# Patient Record
Sex: MALE | Race: WHITE | NOT HISPANIC OR LATINO | Employment: OTHER | URBAN - METROPOLITAN AREA
[De-identification: names, ages, dates, MRNs, and addresses within clinical notes are randomized per-mention and may not be internally consistent; named-entity substitution may affect disease eponyms.]

---

## 2019-11-25 ENCOUNTER — OFFICE VISIT (OUTPATIENT)
Dept: FAMILY MEDICINE | Facility: CLINIC | Age: 69
End: 2019-11-25
Payer: MEDICARE

## 2019-11-25 VITALS
OXYGEN SATURATION: 99 % | SYSTOLIC BLOOD PRESSURE: 126 MMHG | DIASTOLIC BLOOD PRESSURE: 82 MMHG | HEIGHT: 68 IN | RESPIRATION RATE: 18 BRPM | BODY MASS INDEX: 32.51 KG/M2 | WEIGHT: 214.5 LBS | TEMPERATURE: 98 F | HEART RATE: 66 BPM

## 2019-11-25 DIAGNOSIS — Z01.818 PREOP EXAM FOR INTERNAL MEDICINE: ICD-10-CM

## 2019-11-25 DIAGNOSIS — M17.12 PRIMARY OSTEOARTHRITIS OF LEFT KNEE: Primary | ICD-10-CM

## 2019-11-25 PROCEDURE — 99999 PR PBB SHADOW E&M-NEW PATIENT-LVL III: CPT | Mod: PBBFAC,,, | Performed by: INTERNAL MEDICINE

## 2019-11-25 PROCEDURE — 1159F MED LIST DOCD IN RCRD: CPT | Mod: ,,, | Performed by: INTERNAL MEDICINE

## 2019-11-25 PROCEDURE — 1126F PR PAIN SEVERITY QUANTIFIED, NO PAIN PRESENT: ICD-10-PCS | Mod: ,,, | Performed by: INTERNAL MEDICINE

## 2019-11-25 PROCEDURE — 99213 PR OFFICE/OUTPT VISIT, EST, LEVL III, 20-29 MIN: ICD-10-PCS | Mod: S$PBB,,, | Performed by: INTERNAL MEDICINE

## 2019-11-25 PROCEDURE — 99203 OFFICE O/P NEW LOW 30 MIN: CPT | Mod: PBBFAC,PN | Performed by: INTERNAL MEDICINE

## 2019-11-25 PROCEDURE — 99999 PR PBB SHADOW E&M-NEW PATIENT-LVL III: ICD-10-PCS | Mod: PBBFAC,,, | Performed by: INTERNAL MEDICINE

## 2019-11-25 PROCEDURE — 1159F PR MEDICATION LIST DOCUMENTED IN MEDICAL RECORD: ICD-10-PCS | Mod: ,,, | Performed by: INTERNAL MEDICINE

## 2019-11-25 PROCEDURE — 99213 OFFICE O/P EST LOW 20 MIN: CPT | Mod: S$PBB,,, | Performed by: INTERNAL MEDICINE

## 2019-11-25 PROCEDURE — 1126F AMNT PAIN NOTED NONE PRSNT: CPT | Mod: ,,, | Performed by: INTERNAL MEDICINE

## 2019-11-25 RX ORDER — HYDROCHLOROTHIAZIDE 12.5 MG/1
12.5 TABLET ORAL NIGHTLY
COMMUNITY

## 2019-11-25 RX ORDER — TAMSULOSIN HYDROCHLORIDE 0.4 MG/1
CAPSULE ORAL NIGHTLY
COMMUNITY
Start: 2019-09-09

## 2019-11-25 RX ORDER — SILDENAFIL 25 MG/1
25 TABLET, FILM COATED ORAL DAILY PRN
COMMUNITY

## 2019-11-25 RX ORDER — ASPIRIN 81 MG/1
81 TABLET ORAL
COMMUNITY

## 2019-11-25 RX ORDER — IRBESARTAN 300 MG/1
300 TABLET ORAL
COMMUNITY
End: 2022-01-07

## 2019-11-25 NOTE — LETTER
[facility] NETWORK      Michel Hood MD              [facility]  [address] [place] [address], [address]  [facility] [address]  Dept: [phone]  Dept Fax: [phone]                                                                                                     Re: Name: David Lin          : 1950        David Lin has been examined by me on 2019, and appears to be physically well for       ___ General Anesthesia       ___ Sports Participation     ___ School /      ___ Camp        Sincerely,      Michel Hood MD

## 2019-11-25 NOTE — PROGRESS NOTES
[facility] Note    Chief Complaint      Chief Complaint   Patient presents with    Pre-op Exam       History of Present Illness      David Lin is a 69 y.o. male who presents today for   Chief Complaint   Patient presents with    Pre-op Exam   .  Patient comes to appointment today for preop assessment for left tkr . He has good functional status . Denies chest pain , an go up flight of steps with no chest pain or guzman . Had full labs , cxr and ekg done at [facility] all reviewed today and look good .     HPI    No problem-specific Assessment & Plan notes found for this encounter.       Problem List Items Addressed This Visit        Orthopedic    Primary osteoarthritis of left knee - Primary    Overview     For tkr will defer to surgery for post op management             Other    Preop exam for internal medicine    Overview     Pt is cleared for procedure with low risk cardiac complications                 Past Medical History:  History reviewed. No pertinent past medical history.    Past Surgical History:  History reviewed. No pertinent surgical history.    Family History:  family history includes Cancer in his mother.     Social History:  Social History     Socioeconomic History    Marital status:      Spouse name: Not on file    Number of children: Not on file    Years of education: Not on file    Highest education level: Not on file   Occupational History    Not on file   Social Needs    Financial resource strain: Not hard at all    Food insecurity:     Worry: Never true     Inability: Never true    Transportation needs:     Medical: No     Non-medical: No   Tobacco Use    Smoking status: Never Smoker    Smokeless tobacco: Never Used   Substance and Sexual Activity    Alcohol use: Yes     Frequency: 2-4 times a month     Drinks per session: 5 or 6    Drug use: Never    Sexual activity: Yes     Partners: Female   Lifestyle    Physical activity:     Days per week: 2 days     " Minutes per session: 20 min    Stress: Not at all   Relationships    Social connections:     Talks on phone: More than three times a week     Gets together: Once a week     Attends Shinto service: More than 4 times per year     Active member of club or organization: No     Attends meetings of clubs or organizations: Never     Relationship status:    Other Topics Concern    Not on file   Social History Narrative    Not on file       Review of Systems:   Review of Systems   Constitutional: Negative for fever and weight loss.   HENT: Negative for congestion, hearing loss and sore throat.    Eyes: Negative for blurred vision.   Respiratory: Negative for cough and shortness of breath.    Cardiovascular: Negative for chest pain, palpitations, claudication and leg swelling.   Gastrointestinal: Negative for abdominal pain, constipation, diarrhea and heartburn.   Genitourinary: Negative for dysuria.   Musculoskeletal: Positive for joint pain. Negative for back pain and myalgias.   Skin: Negative for rash.   Neurological: Negative for focal weakness and headaches.   Psychiatric/Behavioral: Negative for depression and suicidal ideas. The patient is not nervous/anxious.         Medications:  Outpatient Encounter Medications as of 11/25/2019   Medication Sig Dispense Refill    aspirin (ECOTRIN) 81 MG EC tablet Take 81 mg by mouth.      hydroCHLOROthiazide (HYDRODIURIL) 12.5 MG Tab Take 12.5 mg by mouth.      irbesartan (AVAPRO) 300 MG tablet Take 300 mg by mouth.      sildenafil (VIAGRA) 25 MG tablet Take 25 mg by mouth daily as needed for Erectile Dysfunction.      tamsulosin (FLOMAX) 0.4 mg Cap        No facility-administered encounter medications on file as of 11/25/2019.        Allergies:  Review of patient's allergies indicates:   Allergen Reactions    Succinylcholine chloride Other (See Comments)     STATED "MY MOTHER WAS ALLERGIC TO IT, I WAS TOLD TO NEVER TAKE IT"           Physical Exam      Vitals: " "   11/25/19 1311   BP: 126/82   Pulse: 66   Resp: 18   Temp: 98 °F (36.7 °C)        Vital Signs  Temp: 98 °F (36.7 °C)  Temp src: Oral  Pulse: 66  Resp: 18  SpO2: 99 %  BP: 126/82  BP Location: Right arm  Patient Position: Sitting  Pain Score: 0-No pain  Height and Weight  Height: 5' 8" (172.7 cm)  Weight: 97.3 kg (214 lb 8.1 oz)  BSA (Calculated - sq m): 2.16 sq meters  BMI (Calculated): 32.6  Weight in (lb) to have BMI = 25: 164.1]     Body mass index is 32.62 kg/m².    Physical Exam   Constitutional: He is oriented to person, place, and time. He appears well-developed and well-nourished.   HENT:   Head: Normocephalic.   Eyes: Pupils are equal, round, and reactive to light.   Neck: Normal range of motion. No thyromegaly present.   Cardiovascular: Normal rate and regular rhythm. Exam reveals no gallop and no friction rub.   No murmur heard.  Pulmonary/Chest: Effort normal and breath sounds normal.   Abdominal: Soft. Bowel sounds are normal.   Musculoskeletal: Normal range of motion. He exhibits no edema.   Neurological: He is alert and oriented to person, place, and time. No sensory deficit.   Skin: Skin is warm and dry.   Psychiatric: He has a normal mood and affect. His behavior is normal.        Laboratory:  CBC:  No results for input(s): WBC, RBC, HGB, HCT, PLT, MCV, MCH, MCHC in the last 2160 hours.  CMP:  No results for input(s): GLU, CALCIUM, ALBUMIN, PROT, NA, K, CO2, CL, BUN, ALKPHOS, ALT, AST, BILITOT in the last 2160 hours.    Invalid input(s): CREATININ  URINALYSIS:  No results for input(s): COLORU, CLARITYU, SPECGRAV, PHUR, PROTEINUA, GLUCOSEU, BILIRUBINCON, BLOODU, WBCU, RBCU, BACTERIA, MUCUS, NITRITE, LEUKOCYTESUR, UROBILINOGEN, HYALINECASTS in the last 2160 hours.   LIPIDS:  No results for input(s): TSH, HDL, CHOL, TRIG, LDLCALC, CHOLHDL, NONHDLCHOL, TOTALCHOLEST in the last 2160 hours.  TSH:  No results for input(s): TSH in the last 2160 hours.  A1C:  No results for input(s): HGBA1C in the last " 2160 hours.    Radiology:        Assessment:     David Lin is a 69 y.o.male with:    Primary osteoarthritis of left knee    Preop exam for internal medicine                Plan:     Problem List Items Addressed This Visit        Orthopedic    Primary osteoarthritis of left knee - Primary    Overview     For tkr will defer to surgery for post op management             Other    Preop exam for internal medicine    Overview     Pt is cleared for procedure with low risk cardiac complications                As above, continue current medications and maintain follow up with specialists.  Return to clinic in 6  months.    Frederick W Dantagnan [facility]

## 2020-05-08 DIAGNOSIS — Z12.11 COLON CANCER SCREENING: ICD-10-CM

## 2021-06-24 ENCOUNTER — HOSPITAL ENCOUNTER (EMERGENCY)
Facility: HOSPITAL | Age: 71
Discharge: HOME | End: 2021-06-25
Attending: FAMILY MEDICINE
Payer: MEDICARE

## 2021-06-24 VITALS
OXYGEN SATURATION: 98 % | SYSTOLIC BLOOD PRESSURE: 126 MMHG | WEIGHT: 200 LBS | BODY MASS INDEX: 29.62 KG/M2 | DIASTOLIC BLOOD PRESSURE: 69 MMHG | TEMPERATURE: 98 F | HEIGHT: 69 IN | RESPIRATION RATE: 16 BRPM | HEART RATE: 70 BPM

## 2021-06-24 DIAGNOSIS — S01.81XA FOREHEAD LACERATION, INITIAL ENCOUNTER: Primary | ICD-10-CM

## 2021-06-24 PROCEDURE — 12011 RPR F/E/E/N/L/M 2.5 CM/<: CPT

## 2021-06-24 PROCEDURE — 99284 EMERGENCY DEPT VISIT MOD MDM: CPT | Mod: 25

## 2021-06-24 PROCEDURE — 70450 CT HEAD/BRAIN W/O DYE: CPT | Mod: 26,,, | Performed by: RADIOLOGY

## 2021-06-24 PROCEDURE — 70450 CT HEAD/BRAIN W/O DYE: CPT | Mod: TC

## 2021-06-24 PROCEDURE — 70450 CT HEAD WITHOUT CONTRAST: ICD-10-PCS | Mod: 26,,, | Performed by: RADIOLOGY

## 2021-06-24 RX ORDER — LIDOCAINE HYDROCHLORIDE AND EPINEPHRINE 10; 10 MG/ML; UG/ML
10 INJECTION, SOLUTION INFILTRATION; PERINEURAL ONCE
Status: COMPLETED | OUTPATIENT
Start: 2021-06-25 | End: 2021-06-25

## 2021-06-25 PROCEDURE — 25000003 PHARM REV CODE 250: Performed by: FAMILY MEDICINE

## 2021-06-25 PROCEDURE — 12011 RPR F/E/E/N/L/M 2.5 CM/<: CPT

## 2021-06-25 RX ADMIN — LIDOCAINE HYDROCHLORIDE AND EPINEPHRINE 10 ML: 10; 10 INJECTION, SOLUTION INFILTRATION; PERINEURAL at 12:06

## 2021-08-25 DIAGNOSIS — Z12.11 COLON CANCER SCREENING: ICD-10-CM

## 2022-01-07 RX ORDER — IRBESARTAN 300 MG/1
TABLET ORAL
Qty: 90 TABLET | Refills: 3 | Status: SHIPPED | OUTPATIENT
Start: 2022-01-07 | End: 2022-08-10

## 2022-05-31 ENCOUNTER — HOSPITAL ENCOUNTER (OUTPATIENT)
Dept: RADIOLOGY | Facility: HOSPITAL | Age: 72
Discharge: HOME | End: 2022-05-31
Attending: SPECIALIST
Payer: MEDICARE

## 2022-05-31 DIAGNOSIS — R79.89 ABNORMAL LIVER FUNCTION TEST: ICD-10-CM

## 2022-05-31 PROCEDURE — 76700 US ABDOMEN COMPLETE: ICD-10-PCS | Mod: 26,,, | Performed by: RADIOLOGY

## 2022-05-31 PROCEDURE — 76700 US EXAM ABDOM COMPLETE: CPT | Mod: TC

## 2022-05-31 PROCEDURE — 76700 US EXAM ABDOM COMPLETE: CPT | Mod: 26,,, | Performed by: RADIOLOGY

## 2022-06-30 ENCOUNTER — PATIENT MESSAGE (OUTPATIENT)
Dept: INTERNAL MEDICINE | Facility: CLINIC | Age: 72
End: 2022-06-30
Payer: MEDICARE

## 2022-06-30 ENCOUNTER — TELEPHONE (OUTPATIENT)
Dept: INTERNAL MEDICINE | Facility: CLINIC | Age: 72
End: 2022-06-30
Payer: MEDICARE

## 2022-07-05 ENCOUNTER — OFFICE VISIT (OUTPATIENT)
Dept: SURGERY | Facility: CLINIC | Age: 72
End: 2022-07-05
Payer: MEDICARE

## 2022-07-05 DIAGNOSIS — K43.2 INCISIONAL HERNIA, WITHOUT OBSTRUCTION OR GANGRENE: Primary | ICD-10-CM

## 2022-07-05 DIAGNOSIS — K43.2 INCISIONAL HERNIA: ICD-10-CM

## 2022-07-05 PROCEDURE — 99441 PR PHYSICIAN TELEPHONE EVALUATION 5-10 MIN: ICD-10-PCS | Mod: 95,,, | Performed by: SURGERY

## 2022-07-05 PROCEDURE — 99441 PR PHYSICIAN TELEPHONE EVALUATION 5-10 MIN: CPT | Mod: 95,,, | Performed by: SURGERY

## 2022-07-05 RX ORDER — CEFAZOLIN SODIUM/D5W 2 G/100 ML
2 PLASTIC BAG, INJECTION (ML) INTRAVENOUS
Status: CANCELLED | OUTPATIENT
Start: 2022-07-05

## 2022-07-05 RX ORDER — ENOXAPARIN SODIUM 100 MG/ML
40 INJECTION SUBCUTANEOUS
Status: CANCELLED | OUTPATIENT
Start: 2022-07-05

## 2022-07-05 RX ORDER — SODIUM CHLORIDE 9 MG/ML
INJECTION, SOLUTION INTRAVENOUS CONTINUOUS
Status: CANCELLED | OUTPATIENT
Start: 2022-07-05

## 2022-07-05 NOTE — H&P (VIEW-ONLY)
Established Patient - Audio Only Telehealth Visit     The patient location is:  Home  The chief complaint leading to consultation is:  Incisional hernia  Visit type: Virtual visit with audio only (telephone)  Total time spent with patient:  10 minutes     The reason for the audio only service rather than synchronous audio and video virtual visit was related to technical difficulties or patient preference/necessity.     Each patient to whom I provide medical services by telemedicine is:  (1) informed of the relationship between the physician and patient and the respective role of any other health care provider with respect to management of the patient; and (2) notified that they may decline to receive medical services by telemedicine and may withdraw from such care at any time. Patient verbally consented to receive this service via voice-only telephone call.       HPI:  71-year-old male with incisional hernia from a laparoscopic cholecystectomy site near the umbilicus.  Patient states this has progressively gotten worse.  Has an obvious bulge and defect at the incision site.  Has more pain and tenderness now.  States it occasionally gets stuck where he has to push it in now.  Would like surgical intervention soon as symptoms are progressively getting much worse.    Assessment and plan:  71-year-old male with a incisional hernia from a laparoscopic cholecystectomy extraction site.  Recommend robotic incisional hernia repair.  Patient agrees to this plan.  Will be done urgently due to patient's worsening pain episodes

## 2022-07-06 ENCOUNTER — ANESTHESIA EVENT (OUTPATIENT)
Dept: SURGERY | Facility: OTHER | Age: 72
End: 2022-07-06
Payer: MEDICARE

## 2022-07-06 ENCOUNTER — TELEPHONE (OUTPATIENT)
Dept: SURGERY | Facility: CLINIC | Age: 72
End: 2022-07-06
Payer: MEDICARE

## 2022-07-06 RX ORDER — TADALAFIL 5 MG/1
TABLET ORAL
COMMUNITY
Start: 2022-03-15

## 2022-07-06 NOTE — PRE-PROCEDURE INSTRUCTIONS
Pre admit phone call completed.    Instructions given to patient about NPO status as follows:     The evening before surgery do not eat anything after 9 p.m. ( this includes hard candy, chewing gum and mints).  You may only have GATORADE, POWERADE AND WATER from 9 p.m. until you leave your home. DO NOT  DRINK ANY LIQUIDS ON THE WAY TO THE HOSPITAL.      Patient was also instructed on the below information:    Park in the Parking lot behind the hospital or in the [employer] [facility] across the street from the parking lot.  Parking is complimentary.  If you will be discharged the same day as your procedure, please arrange for a responsible adult to drive you home or  to accompany you if traveling by taxi.  YOU WILL NOT BE PERMITTED TO DRIVE OR TO LEAVE THE HOSPITAL ALONE AFTER SURGERY.  It is strongly recommended that you arrange for someone to remain with you for the first 24 hrs following your surgery.    Patient verbalized understanding of above instructions.

## 2022-07-06 NOTE — TELEPHONE ENCOUNTER
----- Message from Tamela Hoskins sent at 7/6/2022  4:19 PM CDT -----  Contact: [phone]  Pt is calling for surgery time    [phone]

## 2022-07-06 NOTE — TELEPHONE ENCOUNTER
Spoke with patient. Patient was instructed to be at the hospital for 11am. Verbalized understanding. No further issues discussed.

## 2022-07-07 ENCOUNTER — HOSPITAL ENCOUNTER (OUTPATIENT)
Facility: OTHER | Age: 72
Discharge: HOME | End: 2022-07-07
Attending: SURGERY | Admitting: SURGERY
Payer: MEDICARE

## 2022-07-07 ENCOUNTER — ANESTHESIA (OUTPATIENT)
Dept: SURGERY | Facility: OTHER | Age: 72
End: 2022-07-07
Payer: MEDICARE

## 2022-07-07 DIAGNOSIS — K43.2 INCISIONAL HERNIA: ICD-10-CM

## 2022-07-07 DIAGNOSIS — K43.2 INCISIONAL HERNIA, WITHOUT OBSTRUCTION OR GANGRENE: Primary | ICD-10-CM

## 2022-07-07 DIAGNOSIS — Z01.818 PRE-OP TESTING: ICD-10-CM

## 2022-07-07 LAB
ANION GAP SERPL CALC-SCNC: 10 MMOL/L (ref 8–16)
BASOPHILS # BLD AUTO: 0.01 K/UL (ref 0–0.2)
BASOPHILS NFR BLD: 0.2 % (ref 0–1.9)
BUN SERPL-MCNC: 18 MG/DL (ref 8–23)
CALCIUM SERPL-MCNC: 9.7 MG/DL (ref 8.7–10.5)
CHLORIDE SERPL-SCNC: 105 MMOL/L (ref 95–110)
CO2 SERPL-SCNC: 29 MMOL/L (ref 23–29)
CREAT SERPL-MCNC: 1 MG/DL (ref 0.5–1.4)
DIFFERENTIAL METHOD: ABNORMAL
EOSINOPHIL # BLD AUTO: 0.1 K/UL (ref 0–0.5)
EOSINOPHIL NFR BLD: 1.2 % (ref 0–8)
ERYTHROCYTE [DISTWIDTH] IN BLOOD BY AUTOMATED COUNT: 12.1 % (ref 11.5–14.5)
EST. GFR  (AFRICAN AMERICAN): >60 ML/MIN/1.73 M^2
EST. GFR  (NON AFRICAN AMERICAN): >60 ML/MIN/1.73 M^2
GLUCOSE SERPL-MCNC: 111 MG/DL (ref 70–110)
HCT VFR BLD AUTO: 40.1 % (ref 40–54)
HGB BLD-MCNC: 13.4 G/DL (ref 14–18)
IMM GRANULOCYTES # BLD AUTO: 0.01 K/UL (ref 0–0.04)
IMM GRANULOCYTES NFR BLD AUTO: 0.2 % (ref 0–0.5)
LYMPHOCYTES # BLD AUTO: 1 K/UL (ref 1–4.8)
LYMPHOCYTES NFR BLD: 20.2 % (ref 18–48)
MCH RBC QN AUTO: 32.3 PG (ref 27–31)
MCHC RBC AUTO-ENTMCNC: 33.4 G/DL (ref 32–36)
MCV RBC AUTO: 97 FL (ref 82–98)
MONOCYTES # BLD AUTO: 0.5 K/UL (ref 0.3–1)
MONOCYTES NFR BLD: 9.9 % (ref 4–15)
NEUTROPHILS # BLD AUTO: 3.5 K/UL (ref 1.8–7.7)
NEUTROPHILS NFR BLD: 68.3 % (ref 38–73)
NRBC BLD-RTO: 0 /100 WBC
PLATELET # BLD AUTO: 178 K/UL (ref 150–450)
PMV BLD AUTO: 9.7 FL (ref 9.2–12.9)
POTASSIUM SERPL-SCNC: 4.2 MMOL/L (ref 3.5–5.1)
RBC # BLD AUTO: 4.15 M/UL (ref 4.6–6.2)
SODIUM SERPL-SCNC: 144 MMOL/L (ref 136–145)
WBC # BLD AUTO: 5.16 K/UL (ref 3.9–12.7)

## 2022-07-07 PROCEDURE — 85025 COMPLETE CBC W/AUTO DIFF WBC: CPT | Performed by: SURGERY

## 2022-07-07 PROCEDURE — 71000016 HC POSTOP RECOV ADDL HR: Performed by: SURGERY

## 2022-07-07 PROCEDURE — 49655 PR LAP, INCISIONAL HERNIA REPAIR,INCARCERATED: ICD-10-PCS | Mod: ,,, | Performed by: SURGERY

## 2022-07-07 PROCEDURE — 63600175 PHARM REV CODE 636 W HCPCS: Performed by: ANESTHESIOLOGY

## 2022-07-07 PROCEDURE — 36000710: Performed by: SURGERY

## 2022-07-07 PROCEDURE — 63600175 PHARM REV CODE 636 W HCPCS: Performed by: STUDENT IN AN ORGANIZED HEALTH CARE EDUCATION/TRAINING PROGRAM

## 2022-07-07 PROCEDURE — C1781 MESH (IMPLANTABLE): HCPCS | Performed by: SURGERY

## 2022-07-07 PROCEDURE — 71000033 HC RECOVERY, INTIAL HOUR: Performed by: SURGERY

## 2022-07-07 PROCEDURE — 37000009 HC ANESTHESIA EA ADD 15 MINS: Performed by: SURGERY

## 2022-07-07 PROCEDURE — 63600175 PHARM REV CODE 636 W HCPCS: Performed by: SURGERY

## 2022-07-07 PROCEDURE — 71000039 HC RECOVERY, EACH ADD'L HOUR: Performed by: SURGERY

## 2022-07-07 PROCEDURE — 36415 COLL VENOUS BLD VENIPUNCTURE: CPT | Performed by: SURGERY

## 2022-07-07 PROCEDURE — P9045 ALBUMIN (HUMAN), 5%, 250 ML: HCPCS | Mod: JG | Performed by: STUDENT IN AN ORGANIZED HEALTH CARE EDUCATION/TRAINING PROGRAM

## 2022-07-07 PROCEDURE — 80048 BASIC METABOLIC PNL TOTAL CA: CPT | Performed by: SURGERY

## 2022-07-07 PROCEDURE — 93005 ELECTROCARDIOGRAM TRACING: CPT | Mod: 59

## 2022-07-07 PROCEDURE — 93010 EKG 12-LEAD: ICD-10-PCS | Mod: ,,, | Performed by: INTERNAL MEDICINE

## 2022-07-07 PROCEDURE — 36000711: Performed by: SURGERY

## 2022-07-07 PROCEDURE — 37000008 HC ANESTHESIA 1ST 15 MINUTES: Performed by: SURGERY

## 2022-07-07 PROCEDURE — 93010 ELECTROCARDIOGRAM REPORT: CPT | Mod: ,,, | Performed by: INTERNAL MEDICINE

## 2022-07-07 PROCEDURE — 49655 PR LAP, INCISIONAL HERNIA REPAIR,INCARCERATED: CPT | Mod: ,,, | Performed by: SURGERY

## 2022-07-07 PROCEDURE — 27201423 OPTIME MED/SURG SUP & DEVICES STERILE SUPPLY: Performed by: SURGERY

## 2022-07-07 PROCEDURE — 25000003 PHARM REV CODE 250: Performed by: STUDENT IN AN ORGANIZED HEALTH CARE EDUCATION/TRAINING PROGRAM

## 2022-07-07 PROCEDURE — 71000015 HC POSTOP RECOV 1ST HR: Performed by: SURGERY

## 2022-07-07 PROCEDURE — 25000003 PHARM REV CODE 250: Performed by: SURGERY

## 2022-07-07 PROCEDURE — 25000003 PHARM REV CODE 250: Performed by: ANESTHESIOLOGY

## 2022-07-07 DEVICE — MESH PROGRIP LAP 12X16CM LEFT: Type: IMPLANTABLE DEVICE | Site: ABDOMEN | Status: FUNCTIONAL

## 2022-07-07 RX ORDER — DEXMEDETOMIDINE HYDROCHLORIDE 100 UG/ML
INJECTION, SOLUTION INTRAVENOUS
Status: DISCONTINUED | OUTPATIENT
Start: 2022-07-07 | End: 2022-07-07

## 2022-07-07 RX ORDER — HYDROMORPHONE HYDROCHLORIDE 2 MG/ML
INJECTION, SOLUTION INTRAMUSCULAR; INTRAVENOUS; SUBCUTANEOUS
Status: DISCONTINUED | OUTPATIENT
Start: 2022-07-07 | End: 2022-07-07

## 2022-07-07 RX ORDER — MIDAZOLAM HYDROCHLORIDE 1 MG/ML
INJECTION INTRAMUSCULAR; INTRAVENOUS
Status: DISCONTINUED | OUTPATIENT
Start: 2022-07-07 | End: 2022-07-07

## 2022-07-07 RX ORDER — PROPOFOL 10 MG/ML
VIAL (ML) INTRAVENOUS CONTINUOUS PRN
Status: DISCONTINUED | OUTPATIENT
Start: 2022-07-07 | End: 2022-07-07

## 2022-07-07 RX ORDER — SODIUM CHLORIDE 0.9 % (FLUSH) 0.9 %
3 SYRINGE (ML) INJECTION
Status: DISCONTINUED | OUTPATIENT
Start: 2022-07-07 | End: 2022-07-07 | Stop reason: HOSPADM

## 2022-07-07 RX ORDER — OXYCODONE AND ACETAMINOPHEN 7.5; 325 MG/1; MG/1
1 TABLET ORAL EVERY 6 HOURS PRN
Qty: 20 TABLET | Refills: 0 | Status: SHIPPED | OUTPATIENT
Start: 2022-07-07 | End: 2022-08-10

## 2022-07-07 RX ORDER — BUPIVACAINE HYDROCHLORIDE 2.5 MG/ML
INJECTION, SOLUTION EPIDURAL; INFILTRATION; INTRACAUDAL
Status: DISCONTINUED | OUTPATIENT
Start: 2022-07-07 | End: 2022-07-07 | Stop reason: HOSPADM

## 2022-07-07 RX ORDER — MEPERIDINE HYDROCHLORIDE 25 MG/ML
12.5 INJECTION INTRAMUSCULAR; INTRAVENOUS; SUBCUTANEOUS ONCE AS NEEDED
Status: DISCONTINUED | OUTPATIENT
Start: 2022-07-07 | End: 2022-07-07 | Stop reason: HOSPADM

## 2022-07-07 RX ORDER — KETAMINE HCL IN 0.9 % NACL 50 MG/5 ML
SYRINGE (ML) INTRAVENOUS
Status: DISCONTINUED | OUTPATIENT
Start: 2022-07-07 | End: 2022-07-07

## 2022-07-07 RX ORDER — DEXAMETHASONE SODIUM PHOSPHATE 4 MG/ML
INJECTION, SOLUTION INTRA-ARTICULAR; INTRALESIONAL; INTRAMUSCULAR; INTRAVENOUS; SOFT TISSUE
Status: DISCONTINUED | OUTPATIENT
Start: 2022-07-07 | End: 2022-07-07

## 2022-07-07 RX ORDER — FENTANYL CITRATE 50 UG/ML
INJECTION, SOLUTION INTRAMUSCULAR; INTRAVENOUS
Status: DISCONTINUED | OUTPATIENT
Start: 2022-07-07 | End: 2022-07-07

## 2022-07-07 RX ORDER — ALBUMIN HUMAN 50 G/1000ML
SOLUTION INTRAVENOUS CONTINUOUS PRN
Status: DISCONTINUED | OUTPATIENT
Start: 2022-07-07 | End: 2022-07-07

## 2022-07-07 RX ORDER — OXYCODONE HYDROCHLORIDE 5 MG/1
5 TABLET ORAL
Status: DISCONTINUED | OUTPATIENT
Start: 2022-07-07 | End: 2022-07-07 | Stop reason: HOSPADM

## 2022-07-07 RX ORDER — ONDANSETRON 8 MG/1
8 TABLET, ORALLY DISINTEGRATING ORAL ONCE
Qty: 15 TABLET | Refills: 0 | Status: SHIPPED | OUTPATIENT
Start: 2022-07-07 | End: 2022-07-22

## 2022-07-07 RX ORDER — PROCHLORPERAZINE EDISYLATE 5 MG/ML
5 INJECTION INTRAMUSCULAR; INTRAVENOUS EVERY 30 MIN PRN
Status: DISCONTINUED | OUTPATIENT
Start: 2022-07-07 | End: 2022-07-07 | Stop reason: HOSPADM

## 2022-07-07 RX ORDER — CEFAZOLIN SODIUM 2 G/50ML
2 SOLUTION INTRAVENOUS
Status: COMPLETED | OUTPATIENT
Start: 2022-07-07 | End: 2022-07-07

## 2022-07-07 RX ORDER — ONDANSETRON 2 MG/ML
INJECTION INTRAMUSCULAR; INTRAVENOUS
Status: DISCONTINUED | OUTPATIENT
Start: 2022-07-07 | End: 2022-07-07

## 2022-07-07 RX ORDER — SODIUM CHLORIDE 9 MG/ML
INJECTION, SOLUTION INTRAVENOUS CONTINUOUS
Status: DISCONTINUED | OUTPATIENT
Start: 2022-07-07 | End: 2022-07-07 | Stop reason: HOSPADM

## 2022-07-07 RX ORDER — ROCURONIUM BROMIDE 10 MG/ML
INJECTION, SOLUTION INTRAVENOUS
Status: DISCONTINUED | OUTPATIENT
Start: 2022-07-07 | End: 2022-07-07

## 2022-07-07 RX ORDER — PROPOFOL 10 MG/ML
VIAL (ML) INTRAVENOUS
Status: DISCONTINUED | OUTPATIENT
Start: 2022-07-07 | End: 2022-07-07

## 2022-07-07 RX ORDER — LIDOCAINE HYDROCHLORIDE 10 MG/ML
0.5 INJECTION, SOLUTION EPIDURAL; INFILTRATION; INTRACAUDAL; PERINEURAL ONCE
Status: ACTIVE | OUTPATIENT
Start: 2022-07-07

## 2022-07-07 RX ORDER — ENOXAPARIN SODIUM 100 MG/ML
40 INJECTION SUBCUTANEOUS
Status: DISCONTINUED | OUTPATIENT
Start: 2022-07-07 | End: 2022-07-07 | Stop reason: HOSPADM

## 2022-07-07 RX ORDER — HYDROMORPHONE HYDROCHLORIDE 2 MG/ML
0.4 INJECTION, SOLUTION INTRAMUSCULAR; INTRAVENOUS; SUBCUTANEOUS EVERY 5 MIN PRN
Status: DISCONTINUED | OUTPATIENT
Start: 2022-07-07 | End: 2022-07-07 | Stop reason: HOSPADM

## 2022-07-07 RX ORDER — LIDOCAINE HYDROCHLORIDE 20 MG/ML
INJECTION INTRAVENOUS
Status: DISCONTINUED | OUTPATIENT
Start: 2022-07-07 | End: 2022-07-07

## 2022-07-07 RX ORDER — SODIUM CHLORIDE, SODIUM LACTATE, POTASSIUM CHLORIDE, CALCIUM CHLORIDE 600; 310; 30; 20 MG/100ML; MG/100ML; MG/100ML; MG/100ML
INJECTION, SOLUTION INTRAVENOUS CONTINUOUS
Status: ACTIVE | OUTPATIENT
Start: 2022-07-07

## 2022-07-07 RX ADMIN — ROCURONIUM BROMIDE 10 MG: 10 SOLUTION INTRAVENOUS at 02:07

## 2022-07-07 RX ADMIN — ROCURONIUM BROMIDE 20 MG: 10 SOLUTION INTRAVENOUS at 02:07

## 2022-07-07 RX ADMIN — PROPOFOL 150 MG: 10 INJECTION, EMULSION INTRAVENOUS at 01:07

## 2022-07-07 RX ADMIN — SODIUM CHLORIDE, SODIUM LACTATE, POTASSIUM CHLORIDE, AND CALCIUM CHLORIDE: 600; 310; 30; 20 INJECTION, SOLUTION INTRAVENOUS at 01:07

## 2022-07-07 RX ADMIN — ROCURONIUM BROMIDE 40 MG: 10 SOLUTION INTRAVENOUS at 01:07

## 2022-07-07 RX ADMIN — MIDAZOLAM HYDROCHLORIDE 2 MG: 1 INJECTION, SOLUTION INTRAMUSCULAR; INTRAVENOUS at 01:07

## 2022-07-07 RX ADMIN — DEXMEDETOMIDINE HYDROCHLORIDE 12 MCG: 100 INJECTION, SOLUTION, CONCENTRATE INTRAVENOUS at 02:07

## 2022-07-07 RX ADMIN — HYDROMORPHONE HYDROCHLORIDE 0.2 MG: 2 INJECTION INTRAMUSCULAR; INTRAVENOUS; SUBCUTANEOUS at 04:07

## 2022-07-07 RX ADMIN — Medication 40 MG: at 02:07

## 2022-07-07 RX ADMIN — HYDROMORPHONE HYDROCHLORIDE 0.5 MG: 2 INJECTION INTRAMUSCULAR; INTRAVENOUS; SUBCUTANEOUS at 03:07

## 2022-07-07 RX ADMIN — GLYCOPYRROLATE 0.2 MG: 0.2 INJECTION, SOLUTION INTRAMUSCULAR; INTRAVITREAL at 02:07

## 2022-07-07 RX ADMIN — HYDROMORPHONE HYDROCHLORIDE 0.4 MG: 2 INJECTION INTRAMUSCULAR; INTRAVENOUS; SUBCUTANEOUS at 04:07

## 2022-07-07 RX ADMIN — CEFAZOLIN SODIUM 2 G: 2 SOLUTION INTRAVENOUS at 02:07

## 2022-07-07 RX ADMIN — ENOXAPARIN SODIUM 40 MG: 100 INJECTION SUBCUTANEOUS at 12:07

## 2022-07-07 RX ADMIN — HYDROMORPHONE HYDROCHLORIDE 0.5 MG: 2 INJECTION INTRAMUSCULAR; INTRAVENOUS; SUBCUTANEOUS at 04:07

## 2022-07-07 RX ADMIN — PROPOFOL 150 MCG/KG/MIN: 10 INJECTION, EMULSION INTRAVENOUS at 01:07

## 2022-07-07 RX ADMIN — PROPOFOL 150 MCG/KG/MIN: 10 INJECTION, EMULSION INTRAVENOUS at 03:07

## 2022-07-07 RX ADMIN — ONDANSETRON HYDROCHLORIDE 4 MG: 2 INJECTION INTRAMUSCULAR; INTRAVENOUS at 03:07

## 2022-07-07 RX ADMIN — SUGAMMADEX 200 MG: 100 INJECTION, SOLUTION INTRAVENOUS at 03:07

## 2022-07-07 RX ADMIN — OXYCODONE 5 MG: 5 TABLET ORAL at 04:07

## 2022-07-07 RX ADMIN — DEXAMETHASONE SODIUM PHOSPHATE 8 MG: 4 INJECTION, SOLUTION INTRAMUSCULAR; INTRAVENOUS at 02:07

## 2022-07-07 RX ADMIN — Medication 10 MG: at 03:07

## 2022-07-07 RX ADMIN — LIDOCAINE HYDROCHLORIDE 80 MG: 20 INJECTION, SOLUTION INTRAVENOUS at 01:07

## 2022-07-07 RX ADMIN — FENTANYL CITRATE 100 MCG: 50 INJECTION, SOLUTION INTRAMUSCULAR; INTRAVENOUS at 01:07

## 2022-07-07 RX ADMIN — ALBUMIN (HUMAN): 12.5 SOLUTION INTRAVENOUS at 02:07

## 2022-07-07 NOTE — OP NOTE
DATE OF PROCEDURE: 07/07/2022  SERVICE: General Surgery.   Surgeon(s) and Role:     * Louie Gutierrez MD - Primary  PREOPERATIVE DIAGNOSIS: incarcerated incisional hernia  POSTOPERATIVE DIAGNOSIS: Same  PROCEDURE: Laparoscopic repair of incisional hernia with mesh.   ANESTHESIA: General endotracheal and local.     DESCRIPTION OF PROCEDURE: The patient was taken to the Operating Room, placed under general anesthesia and prepped and draped in sterile fashion. At this   time, an incision was made in the left upper quadrant, midclavicular subcostal   after infiltrating with local anesthetic. This was 8 mm in nature. Using   Optiview trocar under direct visualization, intra-abdominal access was obtained   without difficulty and pneumoperitoneum was obtained. Further ports were then   placed including a left lower quadrant port, a left mid abdomen port under direct visualization after   infiltrating with local anesthetic. Once the ports were placed, the Xi robot was docked.   we noticed there were some omental adhesions to the ventral hernia defect, and were incarcerated.   We got into the preperitoneal space and created a peritoneal flap starting approximately 6 cm from the defect and  We took these   down with harmonic scalpel where appropriate and bluntly where able to.  The preperitoneal flap was extended at least 5 cm on the contralateral side.  The hernia defect was approximately 3 cm.  This was closed with a 0 V lock suture in a running fashion.  After this was closed a flat piece ProGrip was cut to 10 x 8 cm. This was placed into the abdominal cavity.  The center of the mesh was pulled up to the anterior abdominal wall. Once this was complete, we inspected the mesh and it was in very good   condition, nice and taut against the anterior abdominal wall with   circumferential coverage of the hernia defect.  The peritoneal flap was then closed using a 3-0 V lock suture in a running fashion.  All needles were  removed in count was correct.   The 8mm  ports were then removed. The air was then evacuated and the ports were removed. The   skin of all 4 ports was closed with 4-0 Monocryl suture in subcuticular   fashion. Dermabond was placed over incisions. At this time, the patient   was allowed to awake from general anesthesia and transferred to bed for transfer   to Recovery and an abdominal binder was placed.   COMPLICATIONS: None.   SPONGE COUNT: Correct.   BLOOD LOSS: 15 mL.

## 2022-07-07 NOTE — ANESTHESIA PREPROCEDURE EVALUATION
07/07/2022  David Lin is a 71 y.o., male.      Pre-op Assessment    I have reviewed the Patient Summary Reports.     I have reviewed the Nursing Notes. I have reviewed the NPO Status.   I have reviewed the Medications.     Review of Systems  Anesthesia Hx:  No problems with previous Anesthesia  Denies Family Hx of Anesthesia complications.   Denies Personal Hx of Anesthesia complications.   Social:  Non-Smoker    Hematology/Oncology:  Hematology Normal   Oncology Normal     EENT/Dental:EENT/Dental Normal   Cardiovascular:   Exercise tolerance: good Hypertension    Pulmonary:  Pulmonary Normal    Renal/:  Renal/ Normal     Musculoskeletal:  Musculoskeletal Normal    Neurological:  Neurology Normal    Endocrine:  Endocrine Normal    Dermatological:  Skin Normal    Psych:  Psychiatric Normal           Physical Exam  General: Well nourished, Cooperative, Oriented and Alert    Airway:  Mouth Opening: Normal  TM Distance: Normal  Neck ROM: Normal ROM    Dental:  Intact        Anesthesia Plan  Type of Anesthesia, risks & benefits discussed:    Anesthesia Type: Gen ETT  Intra-op Monitoring Plan: Standard ASA Monitors  Post Op Pain Control Plan: multimodal analgesia  Induction:  IV  Airway Plan: Video and Direct  Informed Consent: Informed consent signed with the Patient and all parties understand the risks and agree with anesthesia plan.  All questions answered.   ASA Score: 2  Day of Surgery Review of History & Physical: H&P Update referred to the surgeon/provider.  Anesthesia Plan Notes: PATIENT'S MOTHER TREATED FOR MALIGNANT HYPERTHERMIA. Will avoid triggering agents.    Ready For Surgery From Anesthesia Perspective.     .

## 2022-07-07 NOTE — DISCHARGE INSTRUCTIONS

## 2022-07-07 NOTE — PLAN OF CARE
David Lin has met all discharge criteria from Phase II. Vital Signs are stable, ambulating  without difficulty.Pain is now under control and tolerable for the pt. Pain score 2 at this time.  Discharge instructions given, patient verbalized understanding. Discharged from facility via wheelchair in stable condition.

## 2022-07-07 NOTE — TRANSFER OF CARE
"Anesthesia Transfer of Care Note    Patient: David Lin    Procedure(s) Performed: Procedure(s) (LRB):  XI ROBOTIC REPAIR, HERNIA, INCISIONAL (N/A)    Patient location: PACU    Anesthesia Type: general    Transport from OR: Transported from OR on 6-10 L/min O2 by face mask with adequate spontaneous ventilation    Post pain: adequate analgesia    Post assessment: no apparent anesthetic complications and tolerated procedure well    Post vital signs: stable    Level of consciousness: awake and alert    Nausea/Vomiting: no nausea/vomiting    Complications: none    Transfer of care protocol was followed      Last vitals:   Visit Vitals  BP (!) 164/75 (BP Location: Left arm, Patient Position: Sitting)   Pulse 63   Temp 36.6 °C (97.8 °F) (Oral)   Resp 16   Ht 5' 9" (1.753 m)   Wt 88.5 kg (195 lb)   SpO2 100%   BMI 28.80 kg/m²     "

## 2022-07-07 NOTE — ANESTHESIA POSTPROCEDURE EVALUATION
Anesthesia Post Evaluation    Patient: David Lin    Procedure(s) Performed: Procedure(s) (LRB):  XI ROBOTIC REPAIR, HERNIA, INCISIONAL (N/A)    Final Anesthesia Type: general      Patient location during evaluation: PACU  Patient participation: Yes- Able to Participate  Level of consciousness: awake and alert  Post-procedure vital signs: reviewed and stable  Pain management: adequate  Airway patency: patent    PONV status at discharge: No PONV  Anesthetic complications: no      Cardiovascular status: blood pressure returned to baseline  Respiratory status: unassisted  Hydration status: euvolemic  Follow-up not needed.          Vitals Value Taken Time   /65 07/07/22 1653   Temp 36.7 °C (98 °F) 07/07/22 1653   Pulse 81 07/07/22 1659   Resp 18 07/07/22 1653   SpO2 92 % 07/07/22 1659   Vitals shown include unvalidated device data.      No case tracking events are documented in the log.      Pain/Martín Score: Pain Rating Prior to Med Admin: 7 (7/7/2022  4:46 PM)  Pain Rating Post Med Admin: 5 (Patient states the pain is now at a 5, but tolerable) (7/7/2022  4:54 PM)  Martín Score: 10 (7/7/2022  4:33 PM)

## 2022-07-07 NOTE — ANESTHESIA PROCEDURE NOTES
Intubation    Date/Time: 7/7/2022 1:56 PM  Performed by: Roshan Villareal CRNA  Authorized by: Jignesh Dodson MD     Intubation:     Induction:  Intravenous    Intubated:  Postinduction    Mask Ventilation:  Easy mask    Attempts:  2    Attempted By:  Student    Method of Intubation:  Video laryngoscopy    Blade:  Cardoza 3    Laryngeal View Grade: Grade IIA - cords partially seen      Attempted By (2nd Attempt):  CRNA    Method of Intubation (2nd Attempt):  Video laryngoscopy    Blade (2nd Attempt):  Cardoza 4    Laryngeal View Grade (2nd Attempt): Grade IIa - cords partially seen      Difficult Airway Encountered?: No      Complications:  None    Airway Device:  Oral endotracheal tube    Airway Device Size:  7.5    Style/Cuff Inflation:  Cuffed (inflated to minimal occlusive pressure)    Tube secured:  24    Secured at:  The lips    Placement Verified By:  Capnometry    Complicating Factors:  None    Findings Post-Intubation:  BS equal bilateral and atraumatic/condition of teeth unchanged

## 2022-07-08 VITALS
OXYGEN SATURATION: 96 % | DIASTOLIC BLOOD PRESSURE: 79 MMHG | HEART RATE: 86 BPM | BODY MASS INDEX: 28.88 KG/M2 | RESPIRATION RATE: 16 BRPM | WEIGHT: 195 LBS | TEMPERATURE: 98 F | SYSTOLIC BLOOD PRESSURE: 168 MMHG | HEIGHT: 69 IN

## 2022-07-10 NOTE — DISCHARGE SUMMARY
[facility] ([place])  Discharge Note  Short Stay    Procedure(s) (LRB):  XI ROBOTIC REPAIR, HERNIA, INCISIONAL (N/A)    OUTCOME: Patient tolerated treatment/procedure well without complication and is now ready for discharge.    DISPOSITION: Home or Self Care    FINAL DIAGNOSIS:  Incisional hernia  FOLLOWUP: In clinic    DISCHARGE INSTRUCTIONS:    Discharge Procedure Orders   Diet Adult Regular     Lifting restrictions   Order Comments: No heavy lifting or straining greater than 15lbs     No dressing needed

## 2022-08-10 ENCOUNTER — OFFICE VISIT (OUTPATIENT)
Dept: CARDIOLOGY | Facility: CLINIC | Age: 72
End: 2022-08-10
Payer: MEDICARE

## 2022-08-10 ENCOUNTER — LAB VISIT (OUTPATIENT)
Dept: LAB | Facility: HOSPITAL | Age: 72
End: 2022-08-10
Attending: INTERNAL MEDICINE
Payer: MEDICARE

## 2022-08-10 VITALS
WEIGHT: 203 LBS | BODY MASS INDEX: 30.07 KG/M2 | RESPIRATION RATE: 19 BRPM | HEART RATE: 60 BPM | SYSTOLIC BLOOD PRESSURE: 134 MMHG | OXYGEN SATURATION: 98 % | DIASTOLIC BLOOD PRESSURE: 69 MMHG | HEIGHT: 69 IN

## 2022-08-10 DIAGNOSIS — F10.10 ENGAGES IN BINGE CONSUMPTION OF ALCOHOL: ICD-10-CM

## 2022-08-10 DIAGNOSIS — R42 EPISODIC LIGHTHEADEDNESS: Primary | ICD-10-CM

## 2022-08-10 DIAGNOSIS — R00.1 BRADYCARDIA: ICD-10-CM

## 2022-08-10 DIAGNOSIS — I10 PRIMARY HYPERTENSION: ICD-10-CM

## 2022-08-10 DIAGNOSIS — F12.90 MARIJUANA USE: ICD-10-CM

## 2022-08-10 DIAGNOSIS — E65 ABDOMINAL OBESITY: ICD-10-CM

## 2022-08-10 DIAGNOSIS — I70.0 ATHEROSCLEROSIS OF AORTA: ICD-10-CM

## 2022-08-10 DIAGNOSIS — U07.1 COVID-19: ICD-10-CM

## 2022-08-10 DIAGNOSIS — D64.9 ANEMIA, UNSPECIFIED TYPE: ICD-10-CM

## 2022-08-10 DIAGNOSIS — Z91.89 AT RISK FOR CARDIOVASCULAR EVENT: ICD-10-CM

## 2022-08-10 LAB
ALBUMIN/CREAT UR: 4.2 UG/MG (ref 0–30)
CREAT UR-MCNC: 143 MG/DL (ref 23–375)
MICROALBUMIN UR DL<=1MG/L-MCNC: 6 UG/ML

## 2022-08-10 PROCEDURE — 82570 ASSAY OF URINE CREATININE: CPT | Performed by: INTERNAL MEDICINE

## 2022-08-10 PROCEDURE — 99999 PR PBB SHADOW E&M-EST. PATIENT-LVL V: CPT | Mod: PBBFAC,CR,, | Performed by: INTERNAL MEDICINE

## 2022-08-10 PROCEDURE — 99999 PR PBB SHADOW E&M-EST. PATIENT-LVL V: ICD-10-PCS | Mod: PBBFAC,CR,, | Performed by: INTERNAL MEDICINE

## 2022-08-10 PROCEDURE — 99215 OFFICE O/P EST HI 40 MIN: CPT | Mod: PBBFAC | Performed by: INTERNAL MEDICINE

## 2022-08-10 PROCEDURE — 99205 OFFICE O/P NEW HI 60 MIN: CPT | Mod: S$PBB,CR,, | Performed by: INTERNAL MEDICINE

## 2022-08-10 PROCEDURE — 82043 UR ALBUMIN QUANTITATIVE: CPT | Performed by: INTERNAL MEDICINE

## 2022-08-10 PROCEDURE — 99205 PR OFFICE/OUTPT VISIT, NEW, LEVL V, 60-74 MIN: ICD-10-PCS | Mod: S$PBB,CR,, | Performed by: INTERNAL MEDICINE

## 2022-08-10 RX ORDER — KETOCONAZOLE 20 MG/G
1 CREAM TOPICAL 2 TIMES DAILY
COMMUNITY
Start: 2022-02-22

## 2022-08-10 RX ORDER — POLYETHYLENE GLYCOL 3350 17 G/17G
POWDER, FOR SOLUTION ORAL
COMMUNITY
Start: 2022-03-17

## 2022-08-10 RX ORDER — FINASTERIDE 5 MG/1
5 TABLET, FILM COATED ORAL DAILY
COMMUNITY
Start: 2022-06-14

## 2022-08-10 RX ORDER — PROMETHAZINE HYDROCHLORIDE 25 MG/1
TABLET ORAL
COMMUNITY
Start: 2022-03-17

## 2022-08-10 RX ORDER — BISACODYL 5 MG
TABLET, DELAYED RELEASE (ENTERIC COATED) ORAL
COMMUNITY
Start: 2022-03-17

## 2022-08-10 RX ORDER — HYDROCORTISONE 25 MG/G
CREAM TOPICAL
COMMUNITY
Start: 2022-02-22

## 2022-08-10 RX ORDER — DOXAZOSIN 4 MG/1
4 TABLET ORAL DAILY
COMMUNITY
Start: 2022-06-09

## 2022-08-10 RX ORDER — PANTOPRAZOLE SODIUM 40 MG/1
40 TABLET, DELAYED RELEASE ORAL DAILY
COMMUNITY
Start: 2022-07-06

## 2022-08-10 RX ORDER — IRBESARTAN 300 MG/1
150 TABLET ORAL NIGHTLY
Qty: 90 TABLET | Refills: 3
Start: 2022-08-10

## 2022-08-10 NOTE — PATIENT INSTRUCTIONS
Recommended Mediterranean dietEating Heart-Healthy Food: Using the DASH Plan  Eating for your heart doesnt have to be hard or boring. You just need to know how to make healthier choices. The DASH eating plan has been developed to help you do just that. DASH stands for Dietary Approaches to Stop Hypertension. It is a plan that has been proven to be healthier for your heart and to lower your risk for high blood pressure. It can also help lower your risk for cancer, heart disease, osteoporosis, and diabetes.  Choosing from Each Food Group  Choose foods from each of the food groups below each day. Try to get the recommended number of servings for each food group. The serving numbers are based on a diet of 2,000 calories a day. Talk to your doctor if youre unsure about your calorie needs.  Grains   Servings: 7-8 a day  A serving is:  1 slice bread  1 ounce dry cereal  half a cup cooked rice or pasta  Best choices: Whole grains and any grains high in fiber.  Vegetables   Servings: 4-5 a day  A serving is:  1 cup raw leafy vegetable  Half a cup cooked vegetable  Three-quarter cup vegetable juice  Best choices: Fresh or frozen vegetable prepared without too much added salt or fat.    Fruits   Servings: 4-5 a day  A serving is:  Three-quarter cup fruit juice  1 medium fruit  One-quarter cup dried fruit  One-half cup fresh, frozen, or canned fruit  Best choices: A variety of fresh fruits of different colors. Whole fruits are a much better choice than fruit juices.  Low-fat or Fat Free Dairy   Servings: 2-3 a day  A serving is:  8 ounces milk  1 cup yogurt  One and a half ounces cheese  Best choices: Skim or 1% milk, low-fat or fat free yogurt or buttermilk, and low-fat cheeses.       Meat, Poultry, Fish   Servings: 2 or fewer a day  A serving is:  3 ounces cooked meat, poultry, or fish  Best choices: Lean meats and fish. Trim away visible fat. Broil, roast, or boil instead of frying. Remove skin from poultry before eating.   Nuts, Seeds, Beans   Servings: 4-5 a week  A serving is:  One third cup nuts (or one and a half ounces)  2 tablespoons sunflower seeds  Half a cup cooked beans  Best choices: Dry roasted nuts with no salt added, lentils, kidney beans, garbanzo beans, and whole ro beans.    Fats and Oils   Servings: 2 a day  A serving is:  1 teaspoon vegetable oil  1 teaspoon soft margarine  1 tablespoon low-fat mayonnaise  1 teaspoon regular mayonnaise  2 tablespoons light salad dressing  1 tablespoon regular salad dressing  Best choices: Monounsaturated and polyunsaturated fats such as olive, canola, or safflower oil.  Sweets   Servings: 5 a week or fewer  A serving is:  1 tablespoon sugar, maple syrup, or honey  1 tablespoon jam or jelly  1 half-ounce jelly beans (about 15)  8 ounces lemonade  Best choices: Dried fruit can be a satisfying sweet. Choose low-fat sweets when possible. And watch your serving sizes!       Aerobic Exercise for a Healthy Heart  Exercise is a lot more than an energy booster and a stress reliever. It also strengthens your heart muscle, lowers your blood pressure and blood cholesterol, and burns calories.      Remember, some activity is better than none.     Choose an Aerobic Activity  Choose a nonstop activity that makes your heart and lungs work harder than they do when you rest or walk normally. This aerobic exercise can improve the way your heart and other muscles use oxygen. Make it fun by exercising with a friend and choosing an activity you enjoy. Here are some ideas:  Walking  Swimming  Bicycling  Stair climbing  Dancing  Jogging  Exercise Regularly  If you havent been exercising regularly,  get your doctors okay first. Then start slowly.  Here are some tips:  Begin exercising 3 times a week for 5-10 minutes at a time.  When you feel comfortable, add a few minutes each week.  Slowly build up to exercising 3-4 times each week for 20-40 minutes. Aim for a total of 150 or more minutes a  week.  Be sure to carry your nitroglycerin with you when you exercise.  If you get angina when youre exercising, stop what youre doing, take your nitroglycerin, and call your doctor.  © 9653-2331 [employer], [address] [place] [address], [place] [address]. All rights reserved. This information is not intended as a substitute for professional medical care. Always follow your healthcare professional's instructions.    Losing Weight (Cardiovascular)  Excess weight is a major risk factor for heart disease. Losing weight may help keep your arteries open so that your heart can get the oxygen-rich blood it needs. Weight loss can also help lower your blood pressure and reduce your risk for diabetes. All in all, losing weight makes you healthier.          Exercise with a friend. When activity is fun, you're more likely to stick with it.        Calories and Weight Loss  Calories are the fuel your body burns for energy. You get the calories you need from the food you eat. For healthy weight loss, women should eat at least 1,200 calories a day, men at least 1,500.    When you eat more calories than you need, your body stores the extra calories as fat. One pound of fat equals 3,500 calories.    To lose weight, try to burn 500 calories a day more than you eat. To do this, eat 250 calories less each day. Add activity to burn the other 250 calories. Walking 21/2 miles burns about 250 calories.    Eat a variety of healthy foods. Its the best way to make calories count.     Tips for losing weight:  Drink 8 to 10 glasses of water a day.    Dont skip meals. Instead, eat smaller portions.       Brisk Activity Is Best  Brisk activity gets your heart pumping faster. It makes your heart healthier. Its also the best way to burn calories. In fact, your body may keep burning calories for hours after you stop a brisk activity.    Begin by walking 10 minutes most days.    Add more time and speed to your walk. Build up as you feel  able.    Try to walk briskly at least 30 minutes most days. If needed, you can break this into 2 shorter sessions.     Check off the ideas below that you could try to make your day more active:    Take the stairs instead of the elevator.    Park your car farther away and walk.    Ride a bike to work or to the store.    Walk laps around the mall.

## 2022-08-10 NOTE — PROGRESS NOTES
Subjective:    Patient ID:  David Lin is a 71 y.o. 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    Health literacy: medium  Vaccinations: up-to-date, completed COVID, infection in summer 2021, vaccinated, mild, no residual, again late 2021  Activities: very active, up and down 26 steps at least 10 times daily, yard work, walk, sex daily, noted some lightheadedness for the past year, not limiting.  Nicotine: never   Alcohol: max 5 rums in any 24 hours, once a month.  Illicit drugs: none, on marijuana candy 1-2 times weekly for the past 4 years.  Cardiac symptoms: exertional lightheadedness  Home BP: 110 to 120/70 to 80  Medication compliance: yes, have reduced irbesartan to 150 mg nightly for the past month  Diet: regular, use hot sauce.  Caffeine: 3 cpd  Labs:   No results found for: TSH   No results found for: LABA1C, HGBA1C    Lab Results   Component Value Date    WBC 5.16 07/07/2022    HGB 13.4 (L) 07/07/2022    HCT 40.1 07/07/2022    MCV 97 07/07/2022     07/07/2022       CMP  Sodium   Date Value Ref Range Status   07/07/2022 144 136 - 145 mmol/L Final     Potassium   Date Value Ref Range Status   07/07/2022 4.2 3.5 - 5.1 mmol/L Final     Chloride   Date Value Ref Range Status   07/07/2022 105 95 - 110 mmol/L Final     CO2   Date Value Ref Range Status   07/07/2022 29 23 - 29 mmol/L Final     Glucose   Date Value Ref Range Status   07/07/2022 111 (H) 70 - 110 mg/dL Final     BUN   Date Value Ref Range Status   07/07/2022 18 8 - 23 mg/dL Final     Creatinine   Date Value Ref Range Status   07/07/2022 1.0 0.5 - 1.4 mg/dL Final     Calcium   Date Value Ref Range Status   07/07/2022 9.7 8.7 -  10.5 mg/dL Final     Total Protein   Date Value Ref Range Status   2022 6.0 6.0 - 8.4 g/dL Final     Albumin   Date Value Ref Range Status   2022 3.6 3.5 - 5.2 g/dL Final     Total Bilirubin   Date Value Ref Range Status   2022 2.2 (H) 0.1 - 1.0 mg/dL Final     Comment:     For infants and newborns, interpretation of results should be based  on gestational age, weight and in agreement with clinical  observations.    Premature Infant recommended reference ranges:  Up to 24 hours.............<8.0 mg/dL  Up to 48 hours............<12.0 mg/dL  3-5 days..................<15.0 mg/dL  6-29 days.................<15.0 mg/dL       Alkaline Phosphatase   Date Value Ref Range Status   2022 63 55 - 135 U/L Final     AST   Date Value Ref Range Status   2022 12 10 - 40 U/L Final     ALT   Date Value Ref Range Status   2022 14 10 - 44 U/L Final     Anion Gap   Date Value Ref Range Status   2022 10 8 - 16 mmol/L Final     eGFR if    Date Value Ref Range Status   2022 >60 >60 mL/min/1.73 m^2 Final     eGFR if non    Date Value Ref Range Status   2022 >60 >60 mL/min/1.73 m^2 Final     Comment:     Calculation used to obtain the estimated glomerular filtration  rate (eGFR) is the CKD-EPI equation.        @labrcntip(troponini)@  No results found for: BNP}   Lab Results   Component Value Date    CHOL 184 2022     Lab Results   Component Value Date    HDL 60 2022     Lab Results   Component Value Date    LDLCALC 113 2022     Lab Results   Component Value Date    TRIG 71 2022     Lab Results   Component Value Date    CHOLHDL 3.07 2022      Last Echo: none  Last stress test: none  Cardiovascular angiogram: none  EC2022 SB, rate 57, 1st degree AVB  Fundoscopic exam: within the past year, negative for retinopathy    WM here for cardiac evaluation for noted exertional lightheadedness after climb 26 steps, over 10 times  daily. Noted this year and checked BP with symptom and noted SBP about 100. Reduce dose of AVApro to half over the last month with mild improvement. History of HTN since 2000. No other medical problem. High ASCVD 10-year event risk due to mostly age and sex. Family history unknown, was adopted.      Review of Systems   Constitutional: Negative for diaphoresis, fever, malaise/fatigue, night sweats and weight gain.   HENT: Positive for tinnitus. Negative for nosebleeds.    Eyes: Negative for visual disturbance.   Cardiovascular: Negative for chest pain, claudication, cyanosis, dyspnea on exertion, irregular heartbeat, leg swelling, near-syncope, orthopnea, palpitations and paroxysmal nocturnal dyspnea.   Respiratory: Negative for cough, shortness of breath, sleep disturbances due to breathing, snoring and wheezing.         Stratford score 4, awaken refreshed.   Endocrine: Negative for polydipsia and polyuria.   Hematologic/Lymphatic: Does not bruise/bleed easily.   Skin: Positive for skin cancer. Negative for color change, flushing, nail changes, poor wound healing and suspicious lesions.   Musculoskeletal: Positive for joint pain and muscle weakness. Negative for arthritis, falls, gout, joint swelling, muscle cramps and myalgias.   Gastrointestinal: Negative for heartburn, hematemesis, hematochezia, melena and nausea.   Genitourinary: Positive for frequency and nocturia.   Neurological: Positive for dizziness and light-headedness. Negative for disturbances in coordination, excessive daytime sleepiness, focal weakness, headaches, loss of balance, numbness, vertigo and weakness.   Psychiatric/Behavioral: Negative for depression and substance abuse. The patient does not have insomnia and is not nervous/anxious.         Objective:    Physical Exam  Constitutional:       Appearance: He is well-developed.      Comments: RA O2 sat 98%   HENT:      Head: Normocephalic.   Eyes:      Conjunctiva/sclera: Conjunctivae normal.       "Pupils: Pupils are equal, round, and reactive to light.   Neck:      Thyroid: No thyromegaly.      Vascular: No JVD.      Comments: Circumference 16"  Cardiovascular:      Rate and Rhythm: Normal rate and regular rhythm.      Pulses: Intact distal pulses.           Carotid pulses are 1+ on the right side and 1+ on the left side.       Radial pulses are 1+ on the right side and 1+ on the left side.        Dorsalis pedis pulses are 1+ on the right side and 1+ on the left side.        Posterior tibial pulses are 1+ on the right side and 1+ on the left side.      Heart sounds: Normal heart sounds. No murmur heard.    No friction rub. No gallop.   Pulmonary:      Effort: Pulmonary effort is normal.      Breath sounds: Normal breath sounds. No rales.   Chest:      Chest wall: No tenderness.   Abdominal:      General: Bowel sounds are normal.      Palpations: Abdomen is soft.      Tenderness: There is no abdominal tenderness.      Comments: Waist 42"   Musculoskeletal:         General: Normal range of motion.      Cervical back: Normal range of motion and neck supple.   Lymphadenopathy:      Cervical: No cervical adenopathy.   Skin:     General: Skin is warm and dry.      Findings: No rash.   Neurological:      Mental Status: He is alert and oriented to person, place, and time.           Assessment:       1. Episodic lightheadedness, with exertion, onset 2021    2. COVID-19, x2, summer and late 2021, vaccinated, mild, no residual    3. Primary hypertension, dx 2000    4. Engages in binge consumption of alcohol    5. Marijuana use, started 2018    6. Anemia, unspecified type    7. At risk for cardiovascular event, ASCVD 10-year event risk 20.7%, 2022    8. Bradycardia    9. Abdominal obesity    10. Atherosclerosis of aorta          Plan:       David was seen today for establish care.    Diagnoses and all orders for this visit:    Episodic lightheadedness, with exertion, onset 2021  -     Exercise Stress - EKG    COVID-19, " x2, summer and late 2021, vaccinated, mild, no residual    Primary hypertension, dx 2000  -     irbesartan (AVAPRO) 300 MG tablet; Take 0.5 tablets (150 mg total) by mouth every evening.  -     Microalbumin/Creatinine Ratio, Urine; Future  -     Echo; Future    Engages in binge consumption of alcohol  -     Echo; Future    Marijuana use, started 2018    Anemia, unspecified type    At risk for cardiovascular event, ASCVD 10-year event risk 20.7%, 2022  -     Exercise Stress - EKG    Bradycardia  -     Echo; Future    Abdominal obesity    Atherosclerosis of aorta   -     Exercise Stress - EKG    - All medical issues reviewed, continue current Rx.  - Warning signs of MI and stroke given, if symptoms last more than 5 minutes, stop immediately and call 911, then chew 2-4 low-dose ASA (81 mg).  - CV status and all medications reviewed, patient acknowledge good understanding.  - Recommend healthy living: moderate alcohol, healthy diet and regular exercise aiming for fitness, restorative sleep and weight control  - Discussed healthy daily limit of 1 oz of pure alcohol in any 24 hours (roughly 2 12-oz beers, 10 oz of wine (8%-12% alcohol), or 1.5 oz of liquor (80 proof)), can not save up.  - Need good exercise program, 4 key elements: 1. Aerobic (walking, swimming, dancing, jogging, biking, etc, 2. Muscle strengthening / resistance exercise, need to do 2-3 times weekly, 3. Stretching daily, good stretch takes a whole  total minute. 4. Balance exercise daily.   - Have to do some abdominal exercise to rid belly fat  - Instruction for Mediterranean diet and heart healthy exercise given.  - Check home blood pressure, 2 days weekly, do 2 readings within 5 minutes in AM and PM, keep log for review. Target resting BP is less than 130/85.  - Weigh twice weekly, try to lose 1-2 lbs per week. Target weight loss of 5%-10%.  - Highly recommend 30-60 minutes of exercise / activities daily, can have Sunday off, with 2-3 sessions of muscle  strengthening weekly. A  would be very helpful.  - Recommend at least annual cardiovascular evaluation in view of patient's significant risk factors.  - Phone review / encourage use of [facility]      Greater than 50% of the time was spent in counseling and coordination of care. The above assessment and plan have been discussed at length. Labs and procedure over the last 6 months reviewed. Problem List updated. Asked to bring in all active medications / pills bottles with next visit.

## 2022-09-02 ENCOUNTER — HOSPITAL ENCOUNTER (OUTPATIENT)
Dept: CARDIOLOGY | Facility: HOSPITAL | Age: 72
Discharge: HOME | End: 2022-09-02
Attending: INTERNAL MEDICINE
Payer: MEDICARE

## 2022-09-02 VITALS — SYSTOLIC BLOOD PRESSURE: 134 MMHG | DIASTOLIC BLOOD PRESSURE: 59 MMHG | HEART RATE: 76 BPM

## 2022-09-02 VITALS — BODY MASS INDEX: 30.07 KG/M2 | HEIGHT: 69 IN | WEIGHT: 203 LBS

## 2022-09-02 DIAGNOSIS — I10 PRIMARY HYPERTENSION: ICD-10-CM

## 2022-09-02 DIAGNOSIS — R00.1 BRADYCARDIA: ICD-10-CM

## 2022-09-02 DIAGNOSIS — F10.10 ENGAGES IN BINGE CONSUMPTION OF ALCOHOL: ICD-10-CM

## 2022-09-02 LAB
CV STRESS BASE HR: 72 BPM
DIASTOLIC BLOOD PRESSURE: 58 MMHG
OHS CV CPX 1 MINUTE RECOVERY HEART RATE: 115 BPM
OHS CV CPX 85 PERCENT MAX PREDICTED HEART RATE MALE: 124
OHS CV CPX ESTIMATED METS: 10
OHS CV CPX MAX PREDICTED HEART RATE: 146
OHS CV CPX PATIENT IS FEMALE: 0
OHS CV CPX PATIENT IS MALE: 1
OHS CV CPX PEAK DIASTOLIC BLOOD PRESSURE: 63 MMHG
OHS CV CPX PEAK HEAR RATE: 125 BPM
OHS CV CPX PEAK RATE PRESSURE PRODUCT: NORMAL
OHS CV CPX PEAK SYSTOLIC BLOOD PRESSURE: 178 MMHG
OHS CV CPX PERCENT MAX PREDICTED HEART RATE ACHIEVED: 86
OHS CV CPX RATE PRESSURE PRODUCT PRESENTING: 8856
STRESS ECHO POST EXERCISE DUR MIN: 7 MINUTES
STRESS ECHO POST EXERCISE DUR SEC: 10 SECONDS
SYSTOLIC BLOOD PRESSURE: 123 MMHG

## 2022-09-02 PROCEDURE — 93018 CV STRESS TEST I&R ONLY: CPT | Mod: ,,, | Performed by: INTERNAL MEDICINE

## 2022-09-02 PROCEDURE — 93016 CV STRESS TEST SUPVJ ONLY: CPT | Mod: ,,, | Performed by: INTERNAL MEDICINE

## 2022-09-02 PROCEDURE — 93356 ECHO (CUPID ONLY): ICD-10-PCS | Mod: ,,, | Performed by: INTERNAL MEDICINE

## 2022-09-02 PROCEDURE — 93306 ECHO (CUPID ONLY): ICD-10-PCS | Mod: 26,,, | Performed by: INTERNAL MEDICINE

## 2022-09-02 PROCEDURE — 93016 EXERCISE STRESS - EKG (CUPID ONLY): ICD-10-PCS | Mod: ,,, | Performed by: INTERNAL MEDICINE

## 2022-09-02 PROCEDURE — 93306 TTE W/DOPPLER COMPLETE: CPT | Mod: 26,,, | Performed by: INTERNAL MEDICINE

## 2022-09-02 PROCEDURE — 93356 MYOCRD STRAIN IMG SPCKL TRCK: CPT | Mod: ,,, | Performed by: INTERNAL MEDICINE

## 2022-09-02 PROCEDURE — 93018 EXERCISE STRESS - EKG (CUPID ONLY): ICD-10-PCS | Mod: ,,, | Performed by: INTERNAL MEDICINE

## 2022-09-02 PROCEDURE — 93356 MYOCRD STRAIN IMG SPCKL TRCK: CPT

## 2022-09-02 NOTE — NURSING NOTE
2 patient identifier name and date of birth confirmed as stated by patient and matched with armband. Informed consent obtained. Dr Ash here.

## 2022-09-04 LAB
AORTIC ROOT ANNULUS: 2.63 CM
AORTIC VALVE CUSP SEPERATION: 1.84 CM
AV INDEX (PROSTH): 0.8
AV MEAN GRADIENT: 4 MMHG
AV PEAK GRADIENT: 7 MMHG
AV VALVE AREA: 3.28 CM2
AV VELOCITY RATIO: 0.87
BSA FOR ECHO PROCEDURE: 2.12 M2
CV ECHO LV RWT: 0.55 CM
DOP CALC AO PEAK VEL: 1.35 M/S
DOP CALC AO VTI: 31.49 CM
DOP CALC LVOT AREA: 4.1 CM2
DOP CALC LVOT DIAMETER: 2.29 CM
DOP CALC LVOT PEAK VEL: 1.17 M/S
DOP CALC LVOT STROKE VOLUME: 103.2 CM3
DOP CALC MV VTI: 29.77 CM
DOP CALCLVOT PEAK VEL VTI: 25.07 CM
E WAVE DECELERATION TIME: 318.26 MSEC
E/A RATIO: 1.04
E/E' RATIO: 9.65 M/S
ECHO LV POSTERIOR WALL: 1.18 CM (ref 0.6–1.1)
EJECTION FRACTION: 65 %
FRACTIONAL SHORTENING: 36 % (ref 28–44)
INTERVENTRICULAR SEPTUM: 1.23 CM (ref 0.6–1.1)
IVRT: 60.89 MSEC
LA MAJOR: 3.81 CM
LA MINOR: 3.21 CM
LEFT ATRIUM SIZE: 4.14 CM
LEFT ATRIUM VOLUME INDEX MOD: 10.4 ML/M2
LEFT ATRIUM VOLUME MOD: 21.59 CM3
LEFT INTERNAL DIMENSION IN SYSTOLE: 2.77 CM (ref 2.1–4)
LEFT VENTRICLE DIASTOLIC VOLUME INDEX: 40.05 ML/M2
LEFT VENTRICLE DIASTOLIC VOLUME: 83.31 ML
LEFT VENTRICLE MASS INDEX: 90 G/M2
LEFT VENTRICLE SYSTOLIC VOLUME INDEX: 13.8 ML/M2
LEFT VENTRICLE SYSTOLIC VOLUME: 28.8 ML
LEFT VENTRICULAR INTERNAL DIMENSION IN DIASTOLE: 4.31 CM (ref 3.5–6)
LEFT VENTRICULAR MASS: 186.47 G
LV LATERAL E/E' RATIO: 10.25 M/S
LV SEPTAL E/E' RATIO: 9.11 M/S
MV A" WAVE DURATION": 6.47 MSEC
MV MEAN GRADIENT: 1 MMHG
MV PEAK A VEL: 0.79 M/S
MV PEAK E VEL: 0.82 M/S
MV PEAK GRADIENT: 3 MMHG
MV STENOSIS PRESSURE HALF TIME: 68.29 MS
MV VALVE AREA BY CONTINUITY EQUATION: 3.47 CM2
MV VALVE AREA P 1/2 METHOD: 3.22 CM2
PISA MRMAX VEL: 0.02 M/S
PISA TR MAX VEL: 1.95 M/S
PV MEAN GRADIENT: 2 MMHG
PV PEAK VELOCITY: 1.02 CM/S
RA MAJOR: 3.84 CM
RA PRESSURE: 3 MMHG
RA WIDTH: 3.4 CM
RIGHT VENTRICULAR END-DIASTOLIC DIMENSION: 3.81 CM
TDI LATERAL: 0.08 M/S
TDI SEPTAL: 0.09 M/S
TDI: 0.09 M/S
TR MAX PG: 15 MMHG
TRICUSPID ANNULAR PLANE SYSTOLIC EXCURSION: 2.81 CM
TV REST PULMONARY ARTERY PRESSURE: 18 MMHG

## (undated) DEVICE — SUT VLOC BLU GS-22 SZ0 18IN

## (undated) DEVICE — DRAPE SCOPE PILLOW WARMER

## (undated) DEVICE — DRAPE COLUMN DAVINCI XI

## (undated) DEVICE — DRAPE ARM DAVINCI XI

## (undated) DEVICE — OBTURATOR BLADELESS 8MM XI

## (undated) DEVICE — SEAL UNIVERSAL 5MM-8MM XI

## (undated) DEVICE — SUT MONOCRYL 4-0 PS-2

## (undated) DEVICE — SUT BLU GS-22 0 3.5M 23CM 9IN

## (undated) DEVICE — NDL HYPO REG 25G X 1 1/2

## (undated) DEVICE — SUT 0 VICRYL / UR6 (J603)

## (undated) DEVICE — NDL INSUF ULTRA VERESS 120MM

## (undated) DEVICE — SUT VLOC 90 3-0 V-20 NDL 9

## (undated) DEVICE — SUT MCRYL PLUS 4-0 PS2 27IN

## (undated) DEVICE — SUT CTD VICRYL VIL BR UR-6

## (undated) DEVICE — BLADE #15 STERILE CARBON

## (undated) DEVICE — KIT ANTIFOG W/SPONG & FLUID

## (undated) DEVICE — BAG TISSUE RETRIEVAL 5MM

## (undated) DEVICE — COVER TIP CURVED SCISSORS XI

## (undated) DEVICE — PACK ROBOTIC

## (undated) DEVICE — ELECTRODE REM PLYHSV RETURN 9

## (undated) DEVICE — GLOVE BIOGEL 7.0

## (undated) DEVICE — SUT VLOC 2 0 BI 12 V12

## (undated) DEVICE — SOL ELECTROLUBE ANTI-STIC